# Patient Record
Sex: MALE | Race: WHITE | HISPANIC OR LATINO
[De-identification: names, ages, dates, MRNs, and addresses within clinical notes are randomized per-mention and may not be internally consistent; named-entity substitution may affect disease eponyms.]

---

## 2018-09-26 ENCOUNTER — RECORD ABSTRACTING (OUTPATIENT)
Age: 63
End: 2018-09-26

## 2018-09-26 DIAGNOSIS — Z78.9 OTHER SPECIFIED HEALTH STATUS: ICD-10-CM

## 2018-09-26 DIAGNOSIS — Z86.79 PERSONAL HISTORY OF OTHER DISEASES OF THE CIRCULATORY SYSTEM: ICD-10-CM

## 2018-09-26 RX ORDER — ASPIRIN 81 MG
81 TABLET, DELAYED RELEASE (ENTERIC COATED) ORAL DAILY
Refills: 3 | Status: ACTIVE | COMMUNITY

## 2018-10-02 ENCOUNTER — APPOINTMENT (OUTPATIENT)
Dept: CARDIOLOGY | Facility: CLINIC | Age: 63
End: 2018-10-02
Payer: MEDICAID

## 2018-10-02 VITALS
WEIGHT: 159 LBS | RESPIRATION RATE: 14 BRPM | SYSTOLIC BLOOD PRESSURE: 128 MMHG | HEIGHT: 62 IN | HEART RATE: 81 BPM | BODY MASS INDEX: 29.26 KG/M2 | DIASTOLIC BLOOD PRESSURE: 78 MMHG

## 2018-10-02 DIAGNOSIS — Z00.00 ENCOUNTER FOR GENERAL ADULT MEDICAL EXAMINATION W/OUT ABNORMAL FINDINGS: ICD-10-CM

## 2018-10-02 PROCEDURE — 99214 OFFICE O/P EST MOD 30 MIN: CPT

## 2018-10-02 PROCEDURE — 93000 ELECTROCARDIOGRAM COMPLETE: CPT

## 2018-10-02 RX ORDER — TAMSULOSIN HCL 0.4 MG
0.4 CAPSULE ORAL
Refills: 0 | Status: ACTIVE | COMMUNITY

## 2018-12-04 ENCOUNTER — APPOINTMENT (OUTPATIENT)
Dept: CARDIOLOGY | Facility: CLINIC | Age: 63
End: 2018-12-04

## 2018-12-06 ENCOUNTER — APPOINTMENT (OUTPATIENT)
Dept: CARDIOLOGY | Facility: CLINIC | Age: 63
End: 2018-12-06
Payer: MEDICAID

## 2018-12-06 PROCEDURE — 93880 EXTRACRANIAL BILAT STUDY: CPT

## 2018-12-07 ENCOUNTER — APPOINTMENT (OUTPATIENT)
Dept: CARDIOLOGY | Facility: CLINIC | Age: 63
End: 2018-12-07
Payer: MEDICAID

## 2018-12-07 PROCEDURE — 93306 TTE W/DOPPLER COMPLETE: CPT

## 2018-12-17 ENCOUNTER — APPOINTMENT (OUTPATIENT)
Dept: CARDIOLOGY | Facility: CLINIC | Age: 63
End: 2018-12-17
Payer: MEDICAID

## 2018-12-17 VITALS
WEIGHT: 161 LBS | BODY MASS INDEX: 29.63 KG/M2 | HEIGHT: 62 IN | HEART RATE: 81 BPM | DIASTOLIC BLOOD PRESSURE: 83 MMHG | SYSTOLIC BLOOD PRESSURE: 124 MMHG | RESPIRATION RATE: 14 BRPM

## 2018-12-17 DIAGNOSIS — Z86.79 PERSONAL HISTORY OF OTHER DISEASES OF THE CIRCULATORY SYSTEM: ICD-10-CM

## 2018-12-17 PROCEDURE — 99214 OFFICE O/P EST MOD 30 MIN: CPT

## 2018-12-17 PROCEDURE — 93000 ELECTROCARDIOGRAM COMPLETE: CPT

## 2018-12-17 NOTE — PHYSICAL EXAM
[Normal Conjunctiva] : the conjunctiva exhibited no abnormalities [Eyelids - No Xanthelasma] : the eyelids demonstrated no xanthelasmas [Normal Oral Mucosa] : normal oral mucosa [No Oral Pallor] : no oral pallor [No Oral Cyanosis] : no oral cyanosis [Normal Jugular Venous A Waves Present] : normal jugular venous A waves present [Normal Jugular Venous V Waves Present] : normal jugular venous V waves present [Respiration, Rhythm And Depth] : normal respiratory rhythm and effort [Exaggerated Use Of Accessory Muscles For Inspiration] : no accessory muscle use [Auscultation Breath Sounds / Voice Sounds] : lungs were clear to auscultation bilaterally [Abdomen Soft] : soft [Abdomen Tenderness] : non-tender [Abdomen Mass (___ Cm)] : no abdominal mass palpated [Abnormal Walk] : normal gait [Gait - Sufficient For Exercise Testing] : the gait was sufficient for exercise testing [Nail Clubbing] : no clubbing of the fingernails [Cyanosis, Localized] : no localized cyanosis [Petechial Hemorrhages (___cm)] : no petechial hemorrhages [Skin Color & Pigmentation] : normal skin color and pigmentation [] : no rash [No Venous Stasis] : no venous stasis [Skin Lesions] : no skin lesions [No Skin Ulcers] : no skin ulcer [No Xanthoma] : no  xanthoma was observed [Oriented To Time, Place, And Person] : oriented to person, place, and time [Affect] : the affect was normal [Mood] : the mood was normal [No Anxiety] : not feeling anxious [FreeTextEntry1] : Regular rhythm grade 1/6 systolic murmur

## 2018-12-17 NOTE — HISTORY OF PRESENT ILLNESS
[FreeTextEntry1] : He is tolerating his minimal amount of current medication which includes just Flomax and enteric-coated aspirin;\par \par

## 2018-12-17 NOTE — REASON FOR VISIT
[Follow-Up - Clinic] : a clinic follow-up of [FreeTextEntry1] : The patient is a 63-year-old  male from the [place] originally who presents back to the office today for general cardiac checkup and to discuss results of recent echocardiogram and carotid duplex studies;\par \par He has a history of abnormal EKG (RBBB);\par \par The patient has been largely asymptomatic for chest pain, shortness of breath, palpitations or dizziness;\par

## 2018-12-17 NOTE — ASSESSMENT
[FreeTextEntry1] : EKG demonstrated normal sinus rhythm at a rate of 81 with right bundle branch block pattern and left axis deviation;\par \par Echocardiogram demonstrating borderline enlarged ascending aorta, preserved LV size and systolic function with normal ejection fraction, mild TR AI and MR;\par \par Plan:\par \par No additional cardiac workup indicated at this time;\par \par Follow up in this office within 6 months or p.r.n.;\par \par Timely checkups and laboratory blood tests are primary care encouraged;

## 2019-06-05 ENCOUNTER — APPOINTMENT (OUTPATIENT)
Dept: CARDIOLOGY | Facility: CLINIC | Age: 64
End: 2019-06-05
Payer: MEDICAID

## 2019-06-05 ENCOUNTER — NON-APPOINTMENT (OUTPATIENT)
Age: 64
End: 2019-06-05

## 2019-06-05 VITALS
HEART RATE: 77 BPM | HEIGHT: 62 IN | RESPIRATION RATE: 14 BRPM | WEIGHT: 157 LBS | SYSTOLIC BLOOD PRESSURE: 128 MMHG | DIASTOLIC BLOOD PRESSURE: 83 MMHG | BODY MASS INDEX: 28.89 KG/M2

## 2019-06-05 PROCEDURE — 93000 ELECTROCARDIOGRAM COMPLETE: CPT

## 2019-06-05 PROCEDURE — 99214 OFFICE O/P EST MOD 30 MIN: CPT

## 2019-06-05 NOTE — HISTORY OF PRESENT ILLNESS
[FreeTextEntry1] : He takes enteric-coated aspirin 3 times per week 81 mg;\par \par He states he recently returned from the [place] with his son for vacation;\par \par

## 2019-06-05 NOTE — PHYSICAL EXAM
[Normal Conjunctiva] : the conjunctiva exhibited no abnormalities [Eyelids - No Xanthelasma] : the eyelids demonstrated no xanthelasmas [Normal Oral Mucosa] : normal oral mucosa [No Oral Pallor] : no oral pallor [No Oral Cyanosis] : no oral cyanosis [Normal Jugular Venous A Waves Present] : normal jugular venous A waves present [Normal Jugular Venous V Waves Present] : normal jugular venous V waves present [Respiration, Rhythm And Depth] : normal respiratory rhythm and effort [Exaggerated Use Of Accessory Muscles For Inspiration] : no accessory muscle use [Auscultation Breath Sounds / Voice Sounds] : lungs were clear to auscultation bilaterally [FreeTextEntry1] : Regular rhythm grade 1/6 systolic murmur [Abdomen Soft] : soft [Abdomen Tenderness] : non-tender [Abdomen Mass (___ Cm)] : no abdominal mass palpated [Abnormal Walk] : normal gait [Gait - Sufficient For Exercise Testing] : the gait was sufficient for exercise testing [Nail Clubbing] : no clubbing of the fingernails [Cyanosis, Localized] : no localized cyanosis [Petechial Hemorrhages (___cm)] : no petechial hemorrhages [Skin Color & Pigmentation] : normal skin color and pigmentation [] : no rash [No Venous Stasis] : no venous stasis [Skin Lesions] : no skin lesions [No Skin Ulcers] : no skin ulcer [No Xanthoma] : no  xanthoma was observed [Oriented To Time, Place, And Person] : oriented to person, place, and time [Mood] : the mood was normal [Affect] : the affect was normal [No Anxiety] : not feeling anxious

## 2019-06-05 NOTE — ASSESSMENT
[FreeTextEntry1] : EKG demonstrated normal sinus rhythm with complete right bundle branch block pattern and no acute changes\par \par In summary the patient is a 64-year-old gentleman with history of abnormal EKG (RBBB, mild valvular insufficiency and slight heart murmu who has been largely asymptomatic from the cardiac standpoint\par \par Plan:\par \par Patient recommended to followup within 5-6 months or p.r.n.;\par \par Okay to continue current medical regimen and enteric-coated aspirin 3 times per week\par \par Discuss possibility of future cardiac stress test and we'll discuss this next visit later this year or if symptoms develop -sooner.;;;;

## 2019-06-05 NOTE — REASON FOR VISIT
[Follow-Up - Clinic] : a clinic follow-up of [FreeTextEntry1] : The patient is a 64-year-old [place] gentleman with with history of abnormal EKG (RBBB who presented for general cardiac checkup today. \par \par He had recent echocardiogram and carotid duplex study which showed mild valvular insufficiency and mild carotid plaquing.\par \par  He denies any significant symptoms of chest pain, shortness of breath, palpitations or dizziness.;\par \par

## 2019-11-12 ENCOUNTER — EMERGENCY (EMERGENCY)
Facility: HOSPITAL | Age: 64
LOS: 1 days | Discharge: HOME | End: 2019-11-12
Attending: EMERGENCY MEDICINE
Payer: MEDICAID

## 2019-11-12 VITALS — SYSTOLIC BLOOD PRESSURE: 161 MMHG | HEART RATE: 64 BPM | DIASTOLIC BLOOD PRESSURE: 110 MMHG

## 2019-11-12 VITALS
HEART RATE: 72 BPM | WEIGHT: 160.06 LBS | DIASTOLIC BLOOD PRESSURE: 100 MMHG | SYSTOLIC BLOOD PRESSURE: 178 MMHG | TEMPERATURE: 98 F | OXYGEN SATURATION: 99 % | HEIGHT: 65 IN | RESPIRATION RATE: 18 BRPM

## 2019-11-12 LAB
ANION GAP SERPL CALC-SCNC: 12 MMOL/L — SIGNIFICANT CHANGE UP (ref 5–17)
BUN SERPL-MCNC: 13 MG/DL — SIGNIFICANT CHANGE UP (ref 8–20)
CALCIUM SERPL-MCNC: 9 MG/DL — SIGNIFICANT CHANGE UP (ref 8.6–10.2)
CHLORIDE SERPL-SCNC: 103 MMOL/L — SIGNIFICANT CHANGE UP (ref 98–107)
CO2 SERPL-SCNC: 26 MMOL/L — SIGNIFICANT CHANGE UP (ref 22–29)
CREAT SERPL-MCNC: 0.51 MG/DL — SIGNIFICANT CHANGE UP (ref 0.5–1.3)
GLUCOSE SERPL-MCNC: 100 MG/DL — SIGNIFICANT CHANGE UP (ref 70–115)
HCT VFR BLD CALC: 45.6 % — SIGNIFICANT CHANGE UP (ref 39–50)
HGB BLD-MCNC: 15.1 G/DL — SIGNIFICANT CHANGE UP (ref 13–17)
MCHC RBC-ENTMCNC: 30.8 PG — SIGNIFICANT CHANGE UP (ref 27–34)
MCHC RBC-ENTMCNC: 33.1 GM/DL — SIGNIFICANT CHANGE UP (ref 32–36)
MCV RBC AUTO: 93.1 FL — SIGNIFICANT CHANGE UP (ref 80–100)
PLATELET # BLD AUTO: 247 K/UL — SIGNIFICANT CHANGE UP (ref 150–400)
POTASSIUM SERPL-MCNC: 4.3 MMOL/L — SIGNIFICANT CHANGE UP (ref 3.5–5.3)
POTASSIUM SERPL-SCNC: 4.3 MMOL/L — SIGNIFICANT CHANGE UP (ref 3.5–5.3)
RBC # BLD: 4.9 M/UL — SIGNIFICANT CHANGE UP (ref 4.2–5.8)
RBC # FLD: 11.8 % — SIGNIFICANT CHANGE UP (ref 10.3–14.5)
SODIUM SERPL-SCNC: 141 MMOL/L — SIGNIFICANT CHANGE UP (ref 135–145)
WBC # BLD: 6.49 K/UL — SIGNIFICANT CHANGE UP (ref 3.8–10.5)
WBC # FLD AUTO: 6.49 K/UL — SIGNIFICANT CHANGE UP (ref 3.8–10.5)

## 2019-11-12 PROCEDURE — 99284 EMERGENCY DEPT VISIT MOD MDM: CPT

## 2019-11-12 PROCEDURE — 72125 CT NECK SPINE W/O DYE: CPT | Mod: 26

## 2019-11-12 PROCEDURE — 72125 CT NECK SPINE W/O DYE: CPT

## 2019-11-12 PROCEDURE — 80048 BASIC METABOLIC PNL TOTAL CA: CPT

## 2019-11-12 PROCEDURE — T1013: CPT

## 2019-11-12 PROCEDURE — 70450 CT HEAD/BRAIN W/O DYE: CPT | Mod: 26

## 2019-11-12 PROCEDURE — 70450 CT HEAD/BRAIN W/O DYE: CPT

## 2019-11-12 PROCEDURE — 85027 COMPLETE CBC AUTOMATED: CPT

## 2019-11-12 PROCEDURE — 36415 COLL VENOUS BLD VENIPUNCTURE: CPT

## 2019-11-12 RX ORDER — AMLODIPINE BESYLATE 2.5 MG/1
5 TABLET ORAL ONCE
Refills: 0 | Status: DISCONTINUED | OUTPATIENT
Start: 2019-11-12 | End: 2019-11-17

## 2019-11-12 RX ORDER — AMLODIPINE BESYLATE 2.5 MG/1
1 TABLET ORAL
Qty: 7 | Refills: 0
Start: 2019-11-12 | End: 2019-11-18

## 2019-11-12 RX ORDER — ACETAMINOPHEN 500 MG
650 TABLET ORAL ONCE
Refills: 0 | Status: COMPLETED | OUTPATIENT
Start: 2019-11-12 | End: 2019-11-12

## 2019-11-12 RX ADMIN — Medication 650 MILLIGRAM(S): at 16:25

## 2019-11-12 NOTE — ED STATDOCS - PROGRESS NOTE DETAILS
KEARA Hensley NOTE: Pt evaluated at bedside. Pt reports intermittent HA, has had elevated BP recently, not on BP meds, has PMD Denies changes in vision, dizziness, fever, back pain. PE: well appearing, pulses 2+ b/l, CN II-XII intact, strength 5/5, sensation intact, ambulates steady gait, no dysmetria. Pt evaluated prior by intake physician. Otherwise HPI/PE/ROS as noted above. Will follow up plan per intake physician. KEARA Hensley NOTE: Reviewed results with Dr. Davis, will give Norvasc 5mg for asymptomatic HTN and advise PMD f/u. Pt informed of HTN and need to f/u, will send Rx for a week. Pt stable for d/c, reports resolution of HA, VSS, tolerating PO, ambulatory.  Discussion includes results, plan, proper medication use/side effects, and return precautions. Pt advised to f/u with PMD 1-2 day.  Pt given printed copies of lab/radiology for outpt follow up. Pt verbalized understanding/agreement of plan.

## 2019-11-12 NOTE — ED STATDOCS - OBJECTIVE STATEMENT
65 y/o M pt with no significant PMHx presents to the ED c/o intermittent headaches and pain to the back of his head for about 5 days. Pt states he took aleve to no relief. He also reports that his blood pressure has been increasingly high.  denies nausea, vomiting and diarrhea.

## 2019-11-12 NOTE — ED STATDOCS - NS_ ATTENDINGSCRIBEDETAILS _ED_A_ED_FT
I, Darius Davis, performed the initial face to face bedside interview with this patient regarding history of present illness, review of symptoms and relevant past medical, social and family history.  I completed an independent physical examination.  I was the initial provider who evaluated this patient. I have signed out the follow up of any pending tests (i.e. labs, radiological studies) to the ACP.  I have communicated the patient’s plan of care and disposition with the ACP.  The history, relevant review of systems, past medical and surgical history, medical decision making, and physical examination was documented by the scribe in my presence and I attest to the accuracy of the documentation.

## 2019-11-12 NOTE — ED STATDOCS - PATIENT PORTAL LINK FT
You can access the FollowMyHealth Patient Portal offered by [facility] by registering at the following website: [email or web address]. By joining [facility]’s FollowMyHealth portal, you will also be able to view your health information using other applications (apps) compatible with our system.

## 2019-11-20 ENCOUNTER — APPOINTMENT (OUTPATIENT)
Dept: CARDIOLOGY | Facility: CLINIC | Age: 64
End: 2019-11-20
Payer: MEDICAID

## 2019-11-20 ENCOUNTER — NON-APPOINTMENT (OUTPATIENT)
Age: 64
End: 2019-11-20

## 2019-11-20 VITALS
HEIGHT: 65 IN | WEIGHT: 162 LBS | HEART RATE: 66 BPM | DIASTOLIC BLOOD PRESSURE: 84 MMHG | SYSTOLIC BLOOD PRESSURE: 138 MMHG | RESPIRATION RATE: 14 BRPM | BODY MASS INDEX: 26.99 KG/M2

## 2019-11-20 DIAGNOSIS — Z82.49 FAMILY HISTORY OF ISCHEMIC HEART DISEASE AND OTHER DISEASES OF THE CIRCULATORY SYSTEM: ICD-10-CM

## 2019-11-20 PROCEDURE — 93000 ELECTROCARDIOGRAM COMPLETE: CPT

## 2019-11-20 PROCEDURE — 99214 OFFICE O/P EST MOD 30 MIN: CPT

## 2019-11-21 NOTE — PHYSICAL EXAM
[Normal Appearance] : normal appearance [Normal Conjunctiva] : the conjunctiva exhibited no abnormalities [General Appearance - In No Acute Distress] : no acute distress [Normal Oral Mucosa] : normal oral mucosa [Normal Oropharynx] : normal oropharynx [No Jugular Venous Meediros A Waves] : no jugular venous medeiros A waves [Normal Jugular Venous A Waves Present] : normal jugular venous A waves present [Normal Jugular Venous V Waves Present] : normal jugular venous V waves present [Respiration, Rhythm And Depth] : normal respiratory rhythm and effort [] : no respiratory distress [Auscultation Breath Sounds / Voice Sounds] : lungs were clear to auscultation bilaterally [Heart Rate And Rhythm] : heart rate and rhythm were normal [Heart Sounds] : normal S1 and S2 [Bowel Sounds] : normal bowel sounds [Edema] : no peripheral edema present [Nail Clubbing] : no clubbing of the fingernails [Abdomen Soft] : soft [Abnormal Walk] : normal gait [Skin Color & Pigmentation] : normal skin color and pigmentation [Cyanosis, Localized] : no localized cyanosis [Skin Turgor] : normal skin turgor [Affect] : the affect was normal [Impaired Insight] : insight and judgment were intact [Oriented To Time, Place, And Person] : oriented to person, place, and time [FreeTextEntry1] : Grade I/VI systolic murmur

## 2019-11-21 NOTE — HISTORY OF PRESENT ILLNESS
[FreeTextEntry1] : Patient is tolerating cardiac medications without negative side effects including the Norvasc 5 mg daily and ASA 81 mg three days per week.\par \par He has been taking his blood pressures at home and has been averaging 120's to 130's over 80's.\par \par Mr. Elaine reports having a strong family history of cardiovascular disease which includes his father dying of heart failure in his 70's and his sister underwent muti-vessel CABG at age 68.\par \par Most recent Transthoracic Echocardiogram completed in (December 2018) demonstrated dilated ascending aorta (3.93 cm), borderline enlarged right ventricle, mildly dilated right atrium, trace MR, mild TR, mild OH.\par \par Most recent Carotid Doppler completed in (December 2018) demonstrated NO atherosclerotic plaque bilaterally.

## 2019-11-21 NOTE — ASSESSMENT
[FreeTextEntry1] : EKG 11/20/2019:  The EKG illustrates sinus rhythm, rate of 66 bpm, right bundle branch block pattern, T wave inversions in lead III and V1.

## 2019-11-21 NOTE — REASON FOR VISIT
[FreeTextEntry1] : The patient is a 64-year-old [place] gentleman with with history of abnormal EKG (RBBB) who presented for general cardiac checkup today.  Mr. Elaine presents with his daughter today for hospital follow up from [facility] regarding neck/head pain and elevated blood pressure (161/92).  \par \par He was started on Norvasc 5 mg daily and advised to follow up with his Cardiologist due to having a RBBB on EKG.  Apparently, Mr. Elaine has a history of RBBB but was unaware of this.\par \par He reports feeling much better since hospital discharge and the neck/head pain has improved on a muscle relaxer (Flexeril) that was prescribed by his PCP (Dr. Briones).  The patient does experience some shortness of breath on exertion from time to time but is unchanged from his baseline.  He denies CP, PND, orthopnea, palpitations, presyncope, syncope.

## 2019-11-21 NOTE — DISCUSSION/SUMMARY
[FreeTextEntry1] : 1).  Patient is to complete a 1 Day Nuclear Stress test in the near future to assess coronary perfusion and cardiac function.  This is in light of having a strong family history for cardiovascular disease, symptoms of dyspnea on exertion, signs of cardiomegaly on recent TTE and newly developed hypertension.\par \par 2).  Patient is tolerating cardiac medications without negative side effects, continue with current cardiac medication regimen including Norvasc 5 mg daily.\par \par 3).  Diet and lifestyle modification discussed including low sodium, low fat and low carbohydrate weight reducing diet.  He is to implement an aerobic exercise regimen few days per week pending results of his stress test. \par \par 4).  Patient instructed to continue to monitor BP at home and bring log to f/u. \par \par 5).  Recommend patient report any untoward symptoms. \par \par 6).  Follow up with our office in 2 to 3 months.  Will discuss repeating Transthoracic Echocardiogram sometime next spring to reassess ascending aorta dilation and cardiomegaly.

## 2020-01-07 ENCOUNTER — APPOINTMENT (OUTPATIENT)
Dept: CARDIOLOGY | Facility: CLINIC | Age: 65
End: 2020-01-07
Payer: MEDICAID

## 2020-01-07 PROCEDURE — A9500: CPT

## 2020-01-07 PROCEDURE — 78452 HT MUSCLE IMAGE SPECT MULT: CPT

## 2020-01-07 PROCEDURE — 93015 CV STRESS TEST SUPVJ I&R: CPT

## 2020-01-08 RX ORDER — KIT FOR THE PREPARATION OF TECHNETIUM TC99M SESTAMIBI 1 MG/5ML
INJECTION, POWDER, LYOPHILIZED, FOR SOLUTION PARENTERAL
Refills: 0 | Status: COMPLETED | OUTPATIENT
Start: 2020-01-08

## 2020-01-08 RX ADMIN — KIT FOR THE PREPARATION OF TECHNETIUM TC99M SESTAMIBI 0: 1 INJECTION, POWDER, LYOPHILIZED, FOR SOLUTION PARENTERAL at 00:00

## 2020-01-13 ENCOUNTER — APPOINTMENT (OUTPATIENT)
Dept: CARDIOLOGY | Facility: CLINIC | Age: 65
End: 2020-01-13
Payer: MEDICAID

## 2020-01-13 ENCOUNTER — NON-APPOINTMENT (OUTPATIENT)
Age: 65
End: 2020-01-13

## 2020-01-13 VITALS
BODY MASS INDEX: 27.16 KG/M2 | DIASTOLIC BLOOD PRESSURE: 94 MMHG | HEIGHT: 65 IN | HEART RATE: 75 BPM | RESPIRATION RATE: 16 BRPM | SYSTOLIC BLOOD PRESSURE: 142 MMHG | WEIGHT: 163 LBS

## 2020-01-13 PROCEDURE — 93000 ELECTROCARDIOGRAM COMPLETE: CPT

## 2020-01-13 PROCEDURE — 99213 OFFICE O/P EST LOW 20 MIN: CPT

## 2020-01-13 NOTE — PHYSICAL EXAM
[Normal Appearance] : normal appearance [General Appearance - In No Acute Distress] : no acute distress [Normal Conjunctiva] : the conjunctiva exhibited no abnormalities [Normal Oral Mucosa] : normal oral mucosa [Normal Oropharynx] : normal oropharynx [Normal Jugular Venous V Waves Present] : normal jugular venous V waves present [Normal Jugular Venous A Waves Present] : normal jugular venous A waves present [No Jugular Venous Medieros A Waves] : no jugular venous medeiros A waves [] : no respiratory distress [Respiration, Rhythm And Depth] : normal respiratory rhythm and effort [Heart Rate And Rhythm] : heart rate and rhythm were normal [Auscultation Breath Sounds / Voice Sounds] : lungs were clear to auscultation bilaterally [Edema] : no peripheral edema present [FreeTextEntry1] : Grade I/VI systolic murmur [Heart Sounds] : normal S1 and S2 [Abnormal Walk] : normal gait [Abdomen Soft] : soft [Bowel Sounds] : normal bowel sounds [Skin Color & Pigmentation] : normal skin color and pigmentation [Cyanosis, Localized] : no localized cyanosis [Nail Clubbing] : no clubbing of the fingernails [Skin Turgor] : normal skin turgor [Impaired Insight] : insight and judgment were intact [Oriented To Time, Place, And Person] : oriented to person, place, and time [Affect] : the affect was normal

## 2020-01-13 NOTE — ASSESSMENT
[FreeTextEntry1] : EKG 1/13/2020:  The EKG illustrates sinus rhythm, rate of 75 bpm, right bundle branch block pattern, short NV syndrome.  Essentially unchanged.

## 2020-01-13 NOTE — HISTORY OF PRESENT ILLNESS
[FreeTextEntry1] : He reports compliance with taking Norvasc 5 mg daily;  his at-home blood pressures have been averaging in the 120's to 130's over 70's to 80's.  \par \par Most recent Exercise Gated Nuclear Stress test (1/7/2020):  Patient exercised for 10 METS and developed some shortness of breath that resolved with rest.  The patient developed occasional PAC's and PVC's during exercise.  The EKG was negative for ischemia.  Normal Sestamibi myocardial perfusion imaging with artifact, LVEF mildly reduced with 49%.

## 2020-01-13 NOTE — DISCUSSION/SUMMARY
[FreeTextEntry1] : 1).  Patient will complete a Transthoracic Echocardiogram prior to next office visit to assess ascending aorta dilation, cardiac function and valvulopathy.\par \par 2).  Patient is tolerating cardiac medications without negative side effects, continue with current cardiac medication regimen including Norvasc 5 mg QD.\par \par Patient instructed to continue to monitor BP at home and bring log to f/u. \par \par 3).  Diet and lifestyle modification discussed including low sodium, low fat and low carbohydrate weight reducing diet. He is to implement modest aerobic exercise regimen few days per week. \par \par 4).  Follow up with PCP (Dr. Lezama) regarding routine checkups and blood work including fasting lipid panel, have copy faxed to our office. \par \par 5).  Recommend patient report any untoward symptoms. \par \par 6).  Follow up with our office in 4 to 5 months or PRN.

## 2020-01-13 NOTE — REASON FOR VISIT
[FreeTextEntry1] : The patient is a 64-year-old [place] gentleman with with history of abnormal EKG (RBBB) and hypertension who presented for general cardiac checkup today.  Mr. Elaine presents with his daughter today for checkup.  He reports feeling overall well and without cardiac complaints.  The patient denies CP, SOB, GARAY, PND, orthopnea, palpitations, presyncope, syncope, edema.

## 2020-05-29 ENCOUNTER — APPOINTMENT (OUTPATIENT)
Dept: CARDIOLOGY | Facility: CLINIC | Age: 65
End: 2020-05-29

## 2020-06-19 ENCOUNTER — APPOINTMENT (OUTPATIENT)
Dept: CARDIOLOGY | Facility: CLINIC | Age: 65
End: 2020-06-19

## 2020-10-02 ENCOUNTER — NON-APPOINTMENT (OUTPATIENT)
Age: 65
End: 2020-10-02

## 2020-10-02 ENCOUNTER — APPOINTMENT (OUTPATIENT)
Dept: CARDIOLOGY | Facility: CLINIC | Age: 65
End: 2020-10-02
Payer: MEDICAID

## 2020-10-02 VITALS
SYSTOLIC BLOOD PRESSURE: 119 MMHG | TEMPERATURE: 97.7 F | BODY MASS INDEX: 26.66 KG/M2 | HEART RATE: 65 BPM | WEIGHT: 160 LBS | DIASTOLIC BLOOD PRESSURE: 20 MMHG | RESPIRATION RATE: 16 BRPM | HEIGHT: 65 IN

## 2020-10-02 PROCEDURE — 99214 OFFICE O/P EST MOD 30 MIN: CPT

## 2020-10-02 PROCEDURE — 93000 ELECTROCARDIOGRAM COMPLETE: CPT

## 2020-10-02 NOTE — PHYSICAL EXAM
[Normal Conjunctiva] : the conjunctiva exhibited no abnormalities [Eyelids - No Xanthelasma] : the eyelids demonstrated no xanthelasmas [Normal Oral Mucosa] : normal oral mucosa [No Oral Pallor] : no oral pallor [No Oral Cyanosis] : no oral cyanosis [Normal Jugular Venous A Waves Present] : normal jugular venous A waves present [Normal Jugular Venous V Waves Present] : normal jugular venous V waves present [Respiration, Rhythm And Depth] : normal respiratory rhythm and effort [Exaggerated Use Of Accessory Muscles For Inspiration] : no accessory muscle use [Auscultation Breath Sounds / Voice Sounds] : lungs were clear to auscultation bilaterally [FreeTextEntry1] : Regular rhythm grade 1/6 systolic murmur [Abdomen Soft] : soft [Abdomen Tenderness] : non-tender [Abdomen Mass (___ Cm)] : no abdominal mass palpated [Abnormal Walk] : normal gait [Gait - Sufficient For Exercise Testing] : the gait was sufficient for exercise testing [Nail Clubbing] : no clubbing of the fingernails [Cyanosis, Localized] : no localized cyanosis [Petechial Hemorrhages (___cm)] : no petechial hemorrhages [Skin Color & Pigmentation] : normal skin color and pigmentation [] : no rash [No Venous Stasis] : no venous stasis [Skin Lesions] : no skin lesions [No Skin Ulcers] : no skin ulcer [No Xanthoma] : no  xanthoma was observed [Oriented To Time, Place, And Person] : oriented to person, place, and time [Affect] : the affect was normal [Mood] : the mood was normal [No Anxiety] : not feeling anxious

## 2020-10-02 NOTE — HISTORY OF PRESENT ILLNESS
[FreeTextEntry1] : He had a cardiac workup in the past including a nuclear stress test January 2020 which showed normal myocardial perfusion normal blood pressure response i.e. negative chest;\par \par \par He is asking whether he may be able to go off blood pressure medication as he states his blood pressure seems to be well controlled and for a while he had stopped taking the blood pressure medicine and his daughter who is an RN said his blood pressure seems good, but he went back on medicine anyway;\par \par \par

## 2020-10-02 NOTE — REASON FOR VISIT
[Follow-Up - Clinic] : a clinic follow-up of [FreeTextEntry1] : The patient is a 65-year-old  gentleman who presents back to the office today for general cardiac checkup;\par \par He's been followed in the past for mild hypertension, abnormal EKG with RBBB ;\par \par Overall, patient states he's been feeling generally well and has had no significant chest pain, shortness of breath, palpitations, dizziness or syncope;

## 2020-10-02 NOTE — ASSESSMENT
[FreeTextEntry1] : EKG shows normal sinus rhythm at a rate of 65 with right bundle branch block pattern-essentially unchanged;\par \par In summary the patient is a 65-year-old gentleman with history for hypertension which appears well controlled on current medical regimen history of mild enlarged ascending aorta on previous echocardiogram;\par \par Plan:\par \par Patient encouraged to continue current medical regimen;\par \par However, he may hold the blood pressure pill and follow home blood pressure readings and notify us if pressure continues to climb;\par \par Continue low-salt diet and moderate physical exercise regimen\par \par Return to this office within 4 months or p.r.n.\par \par Recommend echocardiogram prior to next visit;;;

## 2020-12-08 ENCOUNTER — APPOINTMENT (OUTPATIENT)
Dept: CARDIOLOGY | Facility: CLINIC | Age: 65
End: 2020-12-08
Payer: MEDICAID

## 2020-12-08 PROCEDURE — 93306 TTE W/DOPPLER COMPLETE: CPT

## 2020-12-08 PROCEDURE — 99072 ADDL SUPL MATRL&STAF TM PHE: CPT

## 2021-01-22 ENCOUNTER — APPOINTMENT (OUTPATIENT)
Dept: CARDIOLOGY | Facility: CLINIC | Age: 66
End: 2021-01-22
Payer: MEDICAID

## 2021-01-22 ENCOUNTER — NON-APPOINTMENT (OUTPATIENT)
Age: 66
End: 2021-01-22

## 2021-01-22 VITALS
SYSTOLIC BLOOD PRESSURE: 132 MMHG | DIASTOLIC BLOOD PRESSURE: 80 MMHG | RESPIRATION RATE: 16 BRPM | TEMPERATURE: 97.2 F | HEART RATE: 82 BPM | WEIGHT: 155 LBS | HEIGHT: 65 IN | BODY MASS INDEX: 25.83 KG/M2

## 2021-01-22 DIAGNOSIS — I38 ENDOCARDITIS, VALVE UNSPECIFIED: ICD-10-CM

## 2021-01-22 PROCEDURE — 99072 ADDL SUPL MATRL&STAF TM PHE: CPT

## 2021-01-22 PROCEDURE — 93000 ELECTROCARDIOGRAM COMPLETE: CPT

## 2021-01-22 PROCEDURE — 99214 OFFICE O/P EST MOD 30 MIN: CPT

## 2021-01-22 RX ORDER — AMLODIPINE BESYLATE 5 MG/1
5 TABLET ORAL DAILY
Qty: 90 | Refills: 3 | Status: DISCONTINUED | COMMUNITY
Start: 2020-01-13 | End: 2021-01-22

## 2021-01-22 NOTE — REASON FOR VISIT
[Follow-Up - Clinic] : a clinic follow-up of [FreeTextEntry1] : The patient is a 65-year-old  gentleman with known history for hypertension and enlarged ascending aorta and aortic root with abnormal EKG (RBBB);\par \par He presents back to the office today for general cardiac check\par \par Fortunately, he has been generally feeling well and has had no significant chest pain, shortness of breath, palpitations or dizziness;\par ;

## 2021-01-22 NOTE — ASSESSMENT
[FreeTextEntry1] : EKG demonstrating normal sinus rhythm at a rate of 82; slight left axis deviation incomplete right bundle branch block pattern-unchanged\par \par Recent transthoracic echocardiogram from December 2020 demonstrating normal cardiac chamber sizes with normal systolic function of the left ventricle and normal ejection fraction in the range of 60-65%. Slight increased septal thickening noted. Mild aortic insufficiency and tricuspid insufficiency with mild to moderate dilatation of the aortic root and ascending aorta (slightly increased from previous study from 2018;;\par \par \par In summary the patient is a 65-year-old gentleman who has a history of enlarged ascending aorta, hypertension which has been reasonably controlled on current medical regimen but could be better\par \par \par Plan:\par \par Patient recommended to change amlodipine to amlodipine/olmasartan  5/20 mg p.o. daily;\par \par Continue other medications same\par \par No additional cardiac workup indicated at this time\par \par Followup with primary care for timely checkups and laboratory blood tests encouraged\par \par Return to office within 5-6 months or p.r.n.;;;;\par \par ;

## 2021-01-22 NOTE — HISTORY OF PRESENT ILLNESS
[FreeTextEntry1] : He has been taking his medications regularly and states he's been doing a modest amount of physical walking and activity\par \par Last nuclear stress test was January 2020 and was negative for ischemia on the myocardial perfusion images;\par \par ;

## 2021-01-22 NOTE — PHYSICAL EXAM
[Normal Conjunctiva] : the conjunctiva exhibited no abnormalities [Eyelids - No Xanthelasma] : the eyelids demonstrated no xanthelasmas [Normal Oral Mucosa] : normal oral mucosa [No Oral Pallor] : no oral pallor [No Oral Cyanosis] : no oral cyanosis [Normal Jugular Venous A Waves Present] : normal jugular venous A waves present [Normal Jugular Venous V Waves Present] : normal jugular venous V waves present [Exaggerated Use Of Accessory Muscles For Inspiration] : no accessory muscle use [Respiration, Rhythm And Depth] : normal respiratory rhythm and effort [Auscultation Breath Sounds / Voice Sounds] : lungs were clear to auscultation bilaterally [FreeTextEntry1] : Regular rhythm grade 1/6 systolic murmur [Abdomen Soft] : soft [Abdomen Tenderness] : non-tender [Abdomen Mass (___ Cm)] : no abdominal mass palpated [Abnormal Walk] : normal gait [Gait - Sufficient For Exercise Testing] : the gait was sufficient for exercise testing [Nail Clubbing] : no clubbing of the fingernails [Cyanosis, Localized] : no localized cyanosis [Petechial Hemorrhages (___cm)] : no petechial hemorrhages [Skin Color & Pigmentation] : normal skin color and pigmentation [] : no rash [No Venous Stasis] : no venous stasis [Skin Lesions] : no skin lesions [No Skin Ulcers] : no skin ulcer [No Xanthoma] : no  xanthoma was observed [Oriented To Time, Place, And Person] : oriented to person, place, and time [Mood] : the mood was normal [Affect] : the affect was normal [No Anxiety] : not feeling anxious

## 2021-02-08 RX ORDER — AMLODIPINE AND OLMESARTAN MEDOXOMIL 5; 20 MG/1; MG/1
5-20 TABLET ORAL DAILY
Qty: 90 | Refills: 3 | Status: DISCONTINUED | COMMUNITY
Start: 2021-01-22 | End: 2021-02-08

## 2021-06-25 ENCOUNTER — APPOINTMENT (OUTPATIENT)
Dept: CARDIOLOGY | Facility: CLINIC | Age: 66
End: 2021-06-25

## 2022-06-20 ENCOUNTER — APPOINTMENT (OUTPATIENT)
Dept: CARDIOLOGY | Facility: CLINIC | Age: 67
End: 2022-06-20

## 2022-06-23 ENCOUNTER — APPOINTMENT (OUTPATIENT)
Dept: CARDIOLOGY | Facility: CLINIC | Age: 67
End: 2022-06-23
Payer: MEDICARE

## 2022-06-23 ENCOUNTER — NON-APPOINTMENT (OUTPATIENT)
Age: 67
End: 2022-06-23

## 2022-06-23 VITALS
BODY MASS INDEX: 25.83 KG/M2 | HEIGHT: 65 IN | SYSTOLIC BLOOD PRESSURE: 122 MMHG | DIASTOLIC BLOOD PRESSURE: 80 MMHG | HEART RATE: 76 BPM | RESPIRATION RATE: 16 BRPM | WEIGHT: 155 LBS

## 2022-06-23 DIAGNOSIS — I77.810 THORACIC AORTIC ECTASIA: ICD-10-CM

## 2022-06-23 DIAGNOSIS — I10 ESSENTIAL (PRIMARY) HYPERTENSION: ICD-10-CM

## 2022-06-23 DIAGNOSIS — R94.31 ABNORMAL ELECTROCARDIOGRAM [ECG] [EKG]: ICD-10-CM

## 2022-06-23 PROCEDURE — 99214 OFFICE O/P EST MOD 30 MIN: CPT

## 2022-06-23 PROCEDURE — 93000 ELECTROCARDIOGRAM COMPLETE: CPT

## 2022-06-23 RX ORDER — AMLODIPINE BESYLATE 5 MG/1
5 TABLET ORAL
Qty: 90 | Refills: 3 | Status: DISCONTINUED | COMMUNITY
End: 2022-06-23

## 2022-06-23 RX ORDER — OLMESARTAN MEDOXOMIL 20 MG/1
20 TABLET, FILM COATED ORAL
Qty: 90 | Refills: 3 | Status: DISCONTINUED | COMMUNITY
End: 2022-06-23

## 2022-06-23 NOTE — HISTORY OF PRESENT ILLNESS
[FreeTextEntry1] : He has been taking his medications regularly and states he's been doing a modest amount of physical walking and activity;\par \par Last nuclear stress test was January 2020 and was negative for ischemia on the myocardial perfusion images;\par \par ;

## 2022-06-23 NOTE — PHYSICAL EXAM
[Well Developed] : well developed [Well Nourished] : well nourished [No Acute Distress] : no acute distress [Normal Venous Pressure] : normal venous pressure [No Carotid Bruit] : no carotid bruit [Normal S1, S2] : normal S1, S2 [No Murmur] : no murmur [No Rub] : no rub [No Gallop] : no gallop [Clear Lung Fields] : clear lung fields [Good Air Entry] : good air entry [No Respiratory Distress] : no respiratory distress  [Soft] : abdomen soft [Non Tender] : non-tender [No Masses/organomegaly] : no masses/organomegaly [Normal Bowel Sounds] : normal bowel sounds [Normal Gait] : normal gait [No Edema] : no edema [No Cyanosis] : no cyanosis [No Clubbing] : no clubbing [No Varicosities] : no varicosities [No Rash] : no rash [No Skin Lesions] : no skin lesions [Moves all extremities] : moves all extremities [No Focal Deficits] : no focal deficits [Normal Speech] : normal speech [Alert and Oriented] : alert and oriented [Normal memory] : normal memory [Normal Conjunctiva] : the conjunctiva exhibited no abnormalities [Eyelids - No Xanthelasma] : the eyelids demonstrated no xanthelasmas [Normal Oral Mucosa] : normal oral mucosa [No Oral Pallor] : no oral pallor [No Oral Cyanosis] : no oral cyanosis [Normal Jugular Venous A Waves Present] : normal jugular venous A waves present [Normal Jugular Venous V Waves Present] : normal jugular venous V waves present [Respiration, Rhythm And Depth] : normal respiratory rhythm and effort [Exaggerated Use Of Accessory Muscles For Inspiration] : no accessory muscle use [Auscultation Breath Sounds / Voice Sounds] : lungs were clear to auscultation bilaterally [FreeTextEntry1] : Regular rhythm grade 1/6 systolic murmur [Abdomen Soft] : soft [Abdomen Tenderness] : non-tender [Abdomen Mass (___ Cm)] : no abdominal mass palpated [Abnormal Walk] : normal gait [Gait - Sufficient For Exercise Testing] : the gait was sufficient for exercise testing [Nail Clubbing] : no clubbing of the fingernails [Cyanosis, Localized] : no localized cyanosis [Petechial Hemorrhages (___cm)] : no petechial hemorrhages [Skin Color & Pigmentation] : normal skin color and pigmentation [] : no rash [No Venous Stasis] : no venous stasis [Skin Lesions] : no skin lesions [No Skin Ulcers] : no skin ulcer [No Xanthoma] : no  xanthoma was observed [Oriented To Time, Place, And Person] : oriented to person, place, and time [Affect] : the affect was normal [Mood] : the mood was normal [No Anxiety] : not feeling anxious

## 2022-06-23 NOTE — REASON FOR VISIT
[Arrhythmia/ECG Abnorrmalities] : arrhythmia/ECG abnormalities [Structural Heart and Valve Disease] : structural heart and valve disease [Carotid, Aortic and Peripheral Vascular Disease] : carotid, aortic and peripheral vascular disease [FreeTextEntry3] : YECENIA Ashton [FreeTextEntry1] : The patient is a 67-year-old  gentleman with known history for hypertension and enlarged ascending aorta and aortic root with abnormal EKG (RBBB);\par \par He presents back to the office today for general cardiac checkup;\par \par Fortunately, he has been generally feeling well and has had no significant chest pain, shortness of breath, palpitations or dizziness;\par ; [Follow-Up - Clinic] : a clinic follow-up of

## 2022-06-23 NOTE — ASSESSMENT
[FreeTextEntry1] : EKG demonstrating normal sinus rhythm at a rate of 76; slight left axis deviation;  right bundle branch block pattern-unchanged\par \par Last transthoracic echocardiogram from December 2020 demonstrating normal cardiac chamber sizes with normal systolic function of the left ventricle and normal ejection fraction in the range of 60-65%. Slight increased septal thickening noted. Mild aortic insufficiency and tricuspid insufficiency with mild to moderate dilatation of the aortic root and ascending aorta (slightly increased from previous study from 2018;;\par \par \par In summary the patient is a 67-year-old gentleman who has a history of enlarged ascending aorta, hypertension which has been reasonably well controlled on current medical regimen;\par \par \par Plan:\par \par Patient recommended to continue combination blood pressure medication and will renew;\par \par Continue other medications same\par \par Recommend transthoracic echocardiogram prior to next visit within 4 months to review cardiac function and ascending aortic enlargement;\par \par Followup with primary care for timely checkups and laboratory blood tests encouraged\par \par Return to office within 5-6 months or p.r.n.;;;;\par \par ;

## 2022-11-08 ENCOUNTER — APPOINTMENT (OUTPATIENT)
Dept: CARDIOLOGY | Facility: CLINIC | Age: 67
End: 2022-11-08

## 2022-11-18 ENCOUNTER — APPOINTMENT (OUTPATIENT)
Dept: CARDIOLOGY | Facility: CLINIC | Age: 67
End: 2022-11-18

## 2024-07-31 NOTE — ED STATDOCS - INTERNATIONAL TRAVEL
Plan: Patient needs to see Nepgrologist
Modify Regimen: Prednisone 10 mg PO QD for one week, then 7.5 mg PO QD then 5 mg PO QD for one week
Detail Level: Zone
Render In Strict Bullet Format?: No
No